# Patient Record
Sex: FEMALE | Race: OTHER | Employment: FULL TIME | URBAN - METROPOLITAN AREA
[De-identification: names, ages, dates, MRNs, and addresses within clinical notes are randomized per-mention and may not be internally consistent; named-entity substitution may affect disease eponyms.]

---

## 2018-03-02 ENCOUNTER — ANESTHESIA EVENT (OUTPATIENT)
Dept: OPERATING ROOM | Age: 33
End: 2018-03-02
Payer: COMMERCIAL

## 2018-03-05 ENCOUNTER — ANESTHESIA (OUTPATIENT)
Dept: OPERATING ROOM | Age: 33
End: 2018-03-05
Payer: COMMERCIAL

## 2018-03-05 ENCOUNTER — HOSPITAL ENCOUNTER (OUTPATIENT)
Age: 33
Setting detail: OUTPATIENT SURGERY
Discharge: HOME | End: 2018-03-05
Attending: SURGERY | Admitting: SURGERY
Payer: COMMERCIAL

## 2018-03-05 VITALS
HEART RATE: 74 BPM | SYSTOLIC BLOOD PRESSURE: 127 MMHG | OXYGEN SATURATION: 100 % | RESPIRATION RATE: 14 BRPM | DIASTOLIC BLOOD PRESSURE: 86 MMHG | BODY MASS INDEX: 21.2 KG/M2 | TEMPERATURE: 97.2 F | WEIGHT: 105.16 LBS | HEIGHT: 59 IN

## 2018-03-05 VITALS
OXYGEN SATURATION: 97 % | RESPIRATION RATE: 10 BRPM | SYSTOLIC BLOOD PRESSURE: 100 MMHG | DIASTOLIC BLOOD PRESSURE: 67 MMHG

## 2018-03-05 LAB — HCG, PREGNANCY URINE (POC): NEGATIVE

## 2018-03-05 PROCEDURE — 3609012400 HC EGD TRANSORAL BIOPSY SINGLE/MULTIPLE: Performed by: SURGERY

## 2018-03-05 PROCEDURE — 84703 CHORIONIC GONADOTROPIN ASSAY: CPT

## 2018-03-05 PROCEDURE — 2580000003 HC RX 258: Performed by: ANESTHESIOLOGY

## 2018-03-05 PROCEDURE — 7100000010 HC PHASE II RECOVERY - FIRST 15 MIN: Performed by: SURGERY

## 2018-03-05 PROCEDURE — 3700000000 HC ANESTHESIA ATTENDED CARE: Performed by: SURGERY

## 2018-03-05 PROCEDURE — 2500000003 HC RX 250 WO HCPCS: Performed by: ANESTHESIOLOGY

## 2018-03-05 PROCEDURE — 6370000000 HC RX 637 (ALT 250 FOR IP): Performed by: SURGERY

## 2018-03-05 PROCEDURE — 7100000011 HC PHASE II RECOVERY - ADDTL 15 MIN: Performed by: SURGERY

## 2018-03-05 PROCEDURE — 3700000001 HC ADD 15 MINUTES (ANESTHESIA): Performed by: SURGERY

## 2018-03-05 PROCEDURE — 88305 TISSUE EXAM BY PATHOLOGIST: CPT

## 2018-03-05 PROCEDURE — 6360000002 HC RX W HCPCS: Performed by: NURSE ANESTHETIST, CERTIFIED REGISTERED

## 2018-03-05 RX ORDER — HYDROMORPHONE HCL 110MG/55ML
0.5 PATIENT CONTROLLED ANALGESIA SYRINGE INTRAVENOUS EVERY 5 MIN PRN
Status: DISCONTINUED | OUTPATIENT
Start: 2018-03-05 | End: 2018-03-05 | Stop reason: HOSPADM

## 2018-03-05 RX ORDER — MIDAZOLAM HYDROCHLORIDE 1 MG/ML
INJECTION INTRAMUSCULAR; INTRAVENOUS PRN
Status: DISCONTINUED | OUTPATIENT
Start: 2018-03-05 | End: 2018-03-05 | Stop reason: SDUPTHER

## 2018-03-05 RX ORDER — HYDRALAZINE HYDROCHLORIDE 20 MG/ML
5 INJECTION INTRAMUSCULAR; INTRAVENOUS EVERY 10 MIN PRN
Status: DISCONTINUED | OUTPATIENT
Start: 2018-03-05 | End: 2018-03-05 | Stop reason: HOSPADM

## 2018-03-05 RX ORDER — FENTANYL CITRATE 50 UG/ML
25 INJECTION, SOLUTION INTRAMUSCULAR; INTRAVENOUS EVERY 5 MIN PRN
Status: DISCONTINUED | OUTPATIENT
Start: 2018-03-05 | End: 2018-03-05 | Stop reason: HOSPADM

## 2018-03-05 RX ORDER — DIPHENHYDRAMINE HYDROCHLORIDE 50 MG/ML
12.5 INJECTION INTRAMUSCULAR; INTRAVENOUS
Status: DISCONTINUED | OUTPATIENT
Start: 2018-03-05 | End: 2018-03-05 | Stop reason: HOSPADM

## 2018-03-05 RX ORDER — ONDANSETRON 2 MG/ML
4 INJECTION INTRAMUSCULAR; INTRAVENOUS
Status: DISCONTINUED | OUTPATIENT
Start: 2018-03-05 | End: 2018-03-05 | Stop reason: HOSPADM

## 2018-03-05 RX ORDER — LABETALOL HYDROCHLORIDE 5 MG/ML
5 INJECTION, SOLUTION INTRAVENOUS EVERY 10 MIN PRN
Status: DISCONTINUED | OUTPATIENT
Start: 2018-03-05 | End: 2018-03-05 | Stop reason: HOSPADM

## 2018-03-05 RX ORDER — CALCIUM CITRATE/VITAMIN D3 200MG-6.25
1 TABLET ORAL DAILY
COMMUNITY

## 2018-03-05 RX ORDER — SODIUM CHLORIDE, SODIUM LACTATE, POTASSIUM CHLORIDE, CALCIUM CHLORIDE 600; 310; 30; 20 MG/100ML; MG/100ML; MG/100ML; MG/100ML
INJECTION, SOLUTION INTRAVENOUS CONTINUOUS
Status: DISCONTINUED | OUTPATIENT
Start: 2018-03-06 | End: 2018-03-05 | Stop reason: HOSPADM

## 2018-03-05 RX ORDER — LIDOCAINE HYDROCHLORIDE 10 MG/ML
1 INJECTION, SOLUTION EPIDURAL; INFILTRATION; INTRACAUDAL; PERINEURAL
Status: COMPLETED | OUTPATIENT
Start: 2018-03-06 | End: 2018-03-05

## 2018-03-05 RX ORDER — DEXAMETHASONE SODIUM PHOSPHATE 10 MG/ML
4 INJECTION INTRAMUSCULAR; INTRAVENOUS
Status: DISCONTINUED | OUTPATIENT
Start: 2018-03-05 | End: 2018-03-05 | Stop reason: HOSPADM

## 2018-03-05 RX ORDER — PROPOFOL 10 MG/ML
INJECTION, EMULSION INTRAVENOUS PRN
Status: DISCONTINUED | OUTPATIENT
Start: 2018-03-05 | End: 2018-03-05 | Stop reason: SDUPTHER

## 2018-03-05 RX ORDER — ALPRAZOLAM 0.25 MG/1
0.25 TABLET ORAL PRN
COMMUNITY

## 2018-03-05 RX ORDER — MEPERIDINE HYDROCHLORIDE 50 MG/ML
12.5 INJECTION INTRAMUSCULAR; INTRAVENOUS; SUBCUTANEOUS EVERY 5 MIN PRN
Status: DISCONTINUED | OUTPATIENT
Start: 2018-03-05 | End: 2018-03-05 | Stop reason: HOSPADM

## 2018-03-05 RX ADMIN — PROPOFOL 20 MG: 10 INJECTION, EMULSION INTRAVENOUS at 12:26

## 2018-03-05 RX ADMIN — PROPOFOL 20 MG: 10 INJECTION, EMULSION INTRAVENOUS at 12:29

## 2018-03-05 RX ADMIN — PROPOFOL 40 MG: 10 INJECTION, EMULSION INTRAVENOUS at 12:20

## 2018-03-05 RX ADMIN — PROPOFOL 20 MG: 10 INJECTION, EMULSION INTRAVENOUS at 12:32

## 2018-03-05 RX ADMIN — SODIUM CHLORIDE, POTASSIUM CHLORIDE, SODIUM LACTATE AND CALCIUM CHLORIDE: 600; 310; 30; 20 INJECTION, SOLUTION INTRAVENOUS at 11:20

## 2018-03-05 RX ADMIN — MIDAZOLAM HYDROCHLORIDE 2 MG: 1 INJECTION, SOLUTION INTRAMUSCULAR; INTRAVENOUS at 12:15

## 2018-03-05 RX ADMIN — LIDOCAINE HYDROCHLORIDE 100 MG: 10 INJECTION, SOLUTION EPIDURAL; INFILTRATION; INTRACAUDAL; PERINEURAL at 12:20

## 2018-03-05 RX ADMIN — PROPOFOL 20 MG: 10 INJECTION, EMULSION INTRAVENOUS at 12:24

## 2018-03-05 RX ADMIN — SODIUM CHLORIDE, POTASSIUM CHLORIDE, SODIUM LACTATE AND CALCIUM CHLORIDE: 600; 310; 30; 20 INJECTION, SOLUTION INTRAVENOUS at 12:16

## 2018-03-05 ASSESSMENT — PULMONARY FUNCTION TESTS
PIF_VALUE: 0
PIF_VALUE: 1
PIF_VALUE: 0

## 2018-03-05 ASSESSMENT — PAIN - FUNCTIONAL ASSESSMENT: PAIN_FUNCTIONAL_ASSESSMENT: 0-10

## 2018-03-05 ASSESSMENT — PAIN SCALES - GENERAL
PAINLEVEL_OUTOF10: 0
PAINLEVEL_OUTOF10: 3

## 2018-03-05 ASSESSMENT — PAIN DESCRIPTION - PAIN TYPE: TYPE: OTHER (COMMENT)

## 2018-03-05 NOTE — ANESTHESIA POSTPROCEDURE EVALUATION
Department of Anesthesiology  Postprocedure Note    Patient: Svetlana Gabriel  MRN: [id number]  YOB: 1985  Date of evaluation: 3/5/2018  Time:  2:50 PM     Procedure Summary     Date:  03/05/18 Room / Location:  [facility]  / [facility]    Anesthesia Start:  1217 Anesthesia Stop:  8832    Procedure:  EGD BIOPSY (N/A ) Diagnosis:  (DX H/O H PYLORI )    Surgeon:  Mimi Watson MD Responsible Provider:  Nolvia Moreira MD    Anesthesia Type:  general, MAC ASA Status:  1          Anesthesia Type: general, MAC    Deana Phase I:      Deana Phase II:      Last vitals: Reviewed and per EMR flowsheets.        Anesthesia Post Evaluation    Patient location during evaluation: PACU  Patient participation: complete - patient participated  Level of consciousness: awake  Pain score: 0  Airway patency: patent  Nausea & Vomiting: no nausea and no vomiting  Complications: no  Cardiovascular status: blood pressure returned to baseline  Respiratory status: acceptable  Hydration status: euvolemic

## 2018-03-05 NOTE — H&P
History and Physical Update    Pt Name: Kellee Nguyễn  MRN: [id number]  YOB: 1985  Date of evaluation: 3/5/2018      [x] I have reviewed the H&P done by Dr Alvarado Pickering dated 3/1/18   which meets the criteria for an Interval History and Physical note and is attached below. [x] I have examined  Kellee Nguyễn  There are no changes to the patient who is scheduled for a EGD by Dr Lori Matos Hx H Pylori. The patient denies health changes since her office visit. No recent fever, chills, n/v, abdominal pain,  productive cough, SOB,  chest pain, open sores or wounds. Vital signs: /82   Pulse 83   Temp 98.1 °F (36.7 °C) (Oral)   Resp 18   Ht 4' 11\" (1.499 m)   Wt 105 lb 2.6 oz (47.7 kg)   LMP 02/18/2018   SpO2 100%   BMI 21.24 kg/m²     Allergies: Iv [iodides]    Medications:    Prior to Admission medications    Medication Sig Start Date End Date Taking? Authorizing Provider   Multiple Vitamins-Minerals (MULTIVITAMIN GUMMIES WOMENS) CHEW Take 1 tablet by mouth daily   Yes Historical Provider, MD   ALPRAZolam (XANAX) 0.25 MG tablet Take 0.25 mg by mouth as needed for Anxiety. Yes Historical Provider, MD   lansoprazole (PREVACID SOLUTAB) 30 MG disintegrating tablet Take 30 mg by mouth 2 times daily   Yes Historical Provider, MD       This is a 34 y. o.thin small framed female who is pleasant, cooperative, alert and oriented x3, in no acute distress. Heart: Heart sounds are normal.  HR 83 regular rate and rhythm without murmur, gallop or rub. Lungs: Normal respiratory effort with good air exchange, unlabored and clear to auscultation without wheezes or rales bilaterally   Abdomen: soft, nontender, nondistended with bowel sounds     Labs:  No results for input(s): HGB, HCT, WBC, MCV, PLT, NA, K, CL, CO2, BUN, CREATININE, GLUCOSE, INR, PROTIME, APTT, AST, ALT, LABALBU, HCG in the last 720 hours.     Buddy Garcia  APRN, ANP-BC  Electronically signed 3/5/2018 at 11:04 AM

## 2018-03-05 NOTE — ANESTHESIA PRE PROCEDURE
Department of Anesthesiology  Preprocedure Note       Name:  Mely Clements   Age:  34 y.o.  :  1985                                          MRN:  [id number]         Date:  3/5/2018      Surgeon: Brooklyn Jennings):  Rosangela Barger MD    Procedure: Procedure(s):  EGD ESOPHAGOGASTRODUODENOSCOPY    Medications prior to admission:   Prior to Admission medications    Medication Sig Start Date End Date Taking? Authorizing Provider   Multiple Vitamins-Minerals (MULTIVITAMIN GUMMIES WOMENS) CHEW Take 1 tablet by mouth daily   Yes Historical Provider, MD   ALPRAZolam (XANAX) 0.25 MG tablet Take 0.25 mg by mouth as needed for Anxiety.    Yes Historical Provider, MD   lansoprazole (PREVACID SOLUTAB) 30 MG disintegrating tablet Take 30 mg by mouth 2 times daily   Yes Historical Provider, MD       Current medications:    Current Facility-Administered Medications   Medication Dose Route Frequency Provider Last Rate Last Dose    [START ON 3/6/2018] lactated ringers infusion   Intravenous Continuous Alexy Bronson MD 50 mL/hr at 18 1120      [START ON 3/6/2018] lidocaine PF 1 % injection 1 mL  1 mL Intradermal Once PRN Alexy Bronson MD        fentaNYL (SUBLIMAZE) injection 25 mcg  25 mcg Intravenous Q5 Min PRN Ernestina Landon MD        HYDROmorphone (DILAUDID) injection 0.5 mg  0.5 mg Intravenous Q5 Min PRN Ernestina Landon MD        fentaNYL (SUBLIMAZE) injection 25 mcg  25 mcg Intravenous Q5 Min PRN Ernestina Landon MD        HYDROmorphone (DILAUDID) injection 0.5 mg  0.5 mg Intravenous Q5 Min PRN Ernestina Landon MD        diphenhydrAMINE (BENADRYL) injection 12.5 mg  12.5 mg Intravenous Once PRN Walker Canavan, MD        ondansetron (ZOFRAN) injection 4 mg  4 mg Intravenous Once PRN Ernestina Landon MD        dexamethasone (DECADRON) injection 4 mg  4 mg Intravenous Once PRN Walker Canavan, MD        labetalol (NORMODYNE;TRANDATE) injection 5 mg  5 mg Intravenous Q10 Min PRN Walker Canavan, MD       [facility]

## 2018-03-06 LAB — SURGICAL PATHOLOGY REPORT: NORMAL

## 2018-03-06 NOTE — OP NOTE
[id number] [facility] [address]                 [address] [place] [address]                                 OPERATIVE REPORT    PATIENT NAME: Rafal Rahman                  :        1985  MED REC NO:   [id number]                             ROOM:  ACCOUNT NO:   [de-identified]                           ADMIT DATE: 2018  PROVIDER:     Mely Case    DATE OF PROCEDURE:  2018    PREOPERATIVE DIAGNOSIS:  History of H. pylori. POSTOPERATIVE DIAGNOSIS:  History of H. pylori. PROCEDURE PERFORMED:  Esophagogastroduodenoscopy with gastric biopsies. ENDOSCOPIST:  Dr. Mally Torres:  CHRISTIANE.    INDICATION:  This 28-year-old lady who has been followed in my office for  H. pylori and was brought in now for followup endoscopy. She has some  tenderness to palpation over epigastric area. FINDINGS:  There was a normal appearing esophagus, normal appearing larynx. The Z-line was normal in appearance. Has a small hiatal hernia. The  fundus and body of the stomach appeared normal and the antrum had some only  mild gastritis. The duodenal bulb, second and third portion of the  duodenum appeared normal.  No obvious significant intraluminal  abnormalities appreciated. Random biopsies were taken from the antrum for  histology. Retroflexion of the scope then confirmed a small hiatal hernia. CONCLUSION OF THE PROCEDURE:  The patient generally tolerated it well and  was taken to recovery in satisfactory condition.         Guera Yates    D: 2018 12:50:08       T: 2018 13:18:18     [email or web address]  Job#: [id number]     Doc#: [id number]    CC:  KAMILA CASTILLO [facility] Jack Kraus

## (undated) DEVICE — 60 ML SYRINGE LUER-LOCK TIP: Brand: MONOJECT

## (undated) DEVICE — ADAPTER TBNG LUER STUB 15 GA INTMED

## (undated) DEVICE — BASIN EMSIS 700ML GRAPHITE PLAS KID SHP GRAD

## (undated) DEVICE — BLOCK BITE 60FR RUBBER ADLT DENTAL

## (undated) DEVICE — FORCEPS BX L240CM WRK CHN 2.8MM STD CAP W/ NDL MIC MESH

## (undated) DEVICE — TUBING, SUCTION, 1/4" X 12', STRAIGHT: Brand: MEDLINE

## (undated) DEVICE — CUP MED 1OZ CLR POLYPR FEED GRAD W/O LID

## (undated) DEVICE — YANKAUER,FLEXIBLE HANDLE,REGLR CAPACITY: Brand: MEDLINE INDUSTRIES, INC.

## (undated) DEVICE — SOLUTION IV IRRIG 500ML 0.9% SODIUM CHL 2F7123